# Patient Record
Sex: FEMALE
[De-identification: names, ages, dates, MRNs, and addresses within clinical notes are randomized per-mention and may not be internally consistent; named-entity substitution may affect disease eponyms.]

---

## 2022-02-20 ENCOUNTER — NURSE TRIAGE (OUTPATIENT)
Dept: OTHER | Facility: CLINIC | Age: 57
End: 2022-02-20

## 2022-02-21 NOTE — TELEPHONE ENCOUNTER
Subjective: Caller states \"possible reaction to norco\"     Current Symptoms: took a dose of norco today for the second time and then her daughter noticed she had red blotches on her face. Face itches and arms itch, but no hives on arms. No shortness of breath or difficulty swallowing    Onset: 6 hours ago; sudden    Associated Symptoms: NA    Pain Severity: 0/10; N/A; none    Temperaturhasn't checked and doesn't feel feverish    What has been tried: nothing    LMP: NA Pregnant: NA    Recommended disposition: home care    Care advice provided, patient verbalizes understanding; denies any other questions or concerns; instructed to call back for any new or worsening symptoms. will follow home care advice  This triage is a result of a call to [facility]. Please do not respond to the triage nurse through this encounter. Any subsequent communication should be directly with the patient.         Reason for Disposition   Localized hives    Protocols used: HIVES-ADULT-